# Patient Record
Sex: FEMALE | Race: WHITE
[De-identification: names, ages, dates, MRNs, and addresses within clinical notes are randomized per-mention and may not be internally consistent; named-entity substitution may affect disease eponyms.]

---

## 2017-01-16 ENCOUNTER — HOSPITAL ENCOUNTER (OUTPATIENT)
Dept: HOSPITAL 45 - C.PAPS | Age: 57
Discharge: HOME | End: 2017-01-16
Attending: OBSTETRICS & GYNECOLOGY
Payer: COMMERCIAL

## 2017-01-16 DIAGNOSIS — Z01.419: Primary | ICD-10-CM

## 2017-01-27 ENCOUNTER — HOSPITAL ENCOUNTER (OUTPATIENT)
Dept: HOSPITAL 45 - C.MAMM | Age: 57
Discharge: HOME | End: 2017-01-27
Attending: OBSTETRICS & GYNECOLOGY
Payer: COMMERCIAL

## 2017-01-27 DIAGNOSIS — Z12.31: Primary | ICD-10-CM

## 2017-01-27 NOTE — MAMMOGRAPHY REPORT
BILATERAL DIGITAL SCREENING MAMMOGRAM TOMOSYNTHESIS WITH CAD: 1/27/2017



TECHNIQUE:  Breast tomosynthesis in addition to standard 2D mammography was performed. Current study
 was also evaluated with a Computer Aided Detection (CAD) system.  



COMPARISON: Comparison is made to exams dated:  2/19/2015 mammogram, 7/18/2012 mammogram, 12/19/2013
 mammogram, 7/26/2012 ultrasound, and 7/26/2012 mammogram - [facility].   



BREAST COMPOSITION:  The tissue of both breasts is heterogeneously dense, which may obscure small ma
sses.  



FINDINGS:  No suspicious masses, calcifications, or areas of architectural distortion are noted in e
ither breast. There has been no significant interval change compared to prior exams. Scattered bilat
eral benign-appearing calcifications are not significantly changed.  

 



IMPRESSION:  ACR BI-RADS CATEGORY 2: BENIGN

There is no mammographic evidence of malignancy. A 1 year screening mammogram is recommended.  The p
atient will receive written notification of the results.  





Approximately 10% of breast cancers are not detected with mammography. A negative mammographic repor
t should not delay biopsy if a clinically suggestive mass is present.



Haley Irwin M.D.          

ah/:1/27/2017 14:28:06  



Imaging Technologist: Chrystal DENT(TATYANA)(ALFRED), [facility]

letter sent: Normal 1/2  

BI-RADS Code: ACR BI-RADS Category 2: Benign

## 2017-04-13 ENCOUNTER — HOSPITAL ENCOUNTER (OUTPATIENT)
Dept: HOSPITAL 45 - C.LAB | Age: 57
Discharge: HOME | End: 2017-04-13
Attending: INTERNAL MEDICINE
Payer: COMMERCIAL

## 2017-04-13 DIAGNOSIS — E55.9: ICD-10-CM

## 2017-04-13 DIAGNOSIS — E78.5: ICD-10-CM

## 2017-04-13 DIAGNOSIS — J45.909: ICD-10-CM

## 2017-04-13 DIAGNOSIS — R39.9: Primary | ICD-10-CM

## 2017-04-13 LAB
APPEARANCE UR: CLEAR
BILIRUB UR-MCNC: (no result) MG/DL
COLOR UR: (no result)
MANUAL MICROSCOPIC REQUIRED?: NO
NITRITE UR QL STRIP: (no result)
PH UR STRIP: 6 [PH] (ref 4.5–7.5)
REVIEW REQ?: YES
SP GR UR STRIP: 1.02 (ref 1–1.03)
URINE EPITHELIAL CELL AUTO: (no result) /LPF (ref 0–5)
UROBILINOGEN UR-MCNC: (no result) MG/DL

## 2017-04-17 ENCOUNTER — HOSPITAL ENCOUNTER (OUTPATIENT)
Dept: HOSPITAL 45 - C.LAB1850 | Age: 57
Discharge: HOME | End: 2017-04-17
Attending: INTERNAL MEDICINE
Payer: COMMERCIAL

## 2017-04-17 ENCOUNTER — HOSPITAL ENCOUNTER (OUTPATIENT)
Dept: HOSPITAL 45 - C.MRI | Age: 57
Discharge: HOME | End: 2017-04-17
Attending: INTERNAL MEDICINE
Payer: COMMERCIAL

## 2017-04-17 DIAGNOSIS — D35.00: ICD-10-CM

## 2017-04-17 DIAGNOSIS — J45.909: ICD-10-CM

## 2017-04-17 DIAGNOSIS — K86.9: Primary | ICD-10-CM

## 2017-04-17 DIAGNOSIS — E55.9: Primary | ICD-10-CM

## 2017-04-17 DIAGNOSIS — E87.6: ICD-10-CM

## 2017-04-17 DIAGNOSIS — E78.5: ICD-10-CM

## 2017-04-17 LAB
ALT SERPL-CCNC: 22 U/L (ref 12–78)
ANION GAP SERPL CALC-SCNC: 6 MMOL/L (ref 3–11)
AST SERPL-CCNC: 12 U/L (ref 15–37)
BASOPHILS # BLD: 0.03 K/UL (ref 0–0.2)
BASOPHILS NFR BLD: 0.5 %
BUN SERPL-MCNC: 18 MG/DL (ref 7–18)
BUN/CREAT SERPL: 22.5 (ref 10–20)
CALCIUM SERPL-MCNC: 8.9 MG/DL (ref 8.5–10.1)
CHLORIDE SERPL-SCNC: 106 MMOL/L (ref 98–107)
CHOLEST/HDLC SERPL: 3 {RATIO}
CO2 SERPL-SCNC: 31 MMOL/L (ref 21–32)
COMPLETE: YES
CREAT SERPL-MCNC: 0.78 MG/DL (ref 0.6–1.2)
EOSINOPHIL NFR BLD AUTO: 214 K/UL (ref 130–400)
GLUCOSE SERPL-MCNC: 89 MG/DL (ref 70–99)
GLUCOSE UR QL: 66 MG/DL
HCT VFR BLD CALC: 42 % (ref 37–47)
IG%: 0.2 %
IMM GRANULOCYTES NFR BLD AUTO: 30.7 %
KETONES UR QL STRIP: 109 MG/DL
LYMPHOCYTES # BLD: 1.89 K/UL (ref 1.2–3.4)
MCH RBC QN AUTO: 29.2 PG (ref 25–34)
MCHC RBC AUTO-ENTMCNC: 33.3 G/DL (ref 32–36)
MCV RBC AUTO: 87.7 FL (ref 80–100)
MONOCYTES NFR BLD: 5.2 %
NEUTROPHILS # BLD AUTO: 1.6 %
NEUTROPHILS NFR BLD AUTO: 61.8 %
NITRITE UR QL STRIP: 105 MG/DL (ref 0–150)
PH UR: 196 MG/DL (ref 0–200)
PMV BLD AUTO: 12 FL (ref 7.4–10.4)
POTASSIUM SERPL-SCNC: 3.4 MMOL/L (ref 3.5–5.1)
RBC # BLD AUTO: 4.79 M/UL (ref 4.2–5.4)
SODIUM SERPL-SCNC: 143 MMOL/L (ref 136–145)
TSH SERPL-ACNC: 1.63 UIU/ML (ref 0.3–4.5)
VERY LOW DENSITY LIPOPROT CALC: 21 MG/DL
WBC # BLD AUTO: 6.16 K/UL (ref 4.8–10.8)

## 2017-04-17 NOTE — DIAGNOSTIC IMAGING REPORT
ABDOMINAL MRI WITH AND WITHOUT INTRAVENOUS CONTRAST



HISTORY:      Pancreatic abnormality. Follow-up. Kidney stones.



TECHNIQUE: Multiplanar multisequence MRI of the abdomen was performed both

before and after the intravenous administration of contrast.



COMPARISON STUDY:  Abdomen and pelvis CT 6/17/2016.



FINDINGS: No change in 1.8 cm benign left adrenal adenoma. No hepatic or splenic

masses. Normal gallbladder. The pancreas is within normal limits. No pancreatic

masses. The common bile duct and main pancreatic duct are normal in course and

caliber. No retroperitoneal lymphadenopathy. The visualized loops of bowel show

no wall thickening or obstruction. There is a 4 mm cyst within the upper pole of

the left kidney. Normal right kidney.



IMPRESSION:  



1. No significant abnormality within the pancreas.

2. Stable 1.8 cm benign left adrenal adenoma. 







Electronically signed by:  Femi Dooley M.D.

4/17/2017 8:50 AM



Dictated Date/Time:  4/17/2017 8:42 AM

## 2017-05-26 ENCOUNTER — HOSPITAL ENCOUNTER (OUTPATIENT)
Dept: HOSPITAL 45 - C.LABSPEC | Age: 57
Discharge: HOME | End: 2017-05-26
Attending: PHYSICIAN ASSISTANT
Payer: COMMERCIAL

## 2017-05-26 ENCOUNTER — HOSPITAL ENCOUNTER (OUTPATIENT)
Dept: HOSPITAL 45 - C.LAB | Age: 57
Discharge: HOME | End: 2017-05-26
Attending: INTERNAL MEDICINE
Payer: COMMERCIAL

## 2017-05-26 DIAGNOSIS — R39.9: Primary | ICD-10-CM

## 2017-05-26 DIAGNOSIS — L29.8: Primary | ICD-10-CM

## 2017-05-26 LAB
APPEARANCE UR: CLEAR
BILIRUB UR-MCNC: (no result) MG/DL
COLOR UR: YELLOW
MANUAL MICROSCOPIC REQUIRED?: NO
NITRITE UR QL STRIP: (no result)
PH UR STRIP: 6.5 [PH] (ref 4.5–7.5)
REVIEW REQ?: YES
SP GR UR STRIP: 1.01 (ref 1–1.03)
URINE BILL WITH OR WITHOUT MIC: (no result)
URINE EPITHELIAL CELL AUTO: >30 /LPF (ref 0–5)
UROBILINOGEN UR-MCNC: (no result) MG/DL

## 2017-06-11 ENCOUNTER — HOSPITAL ENCOUNTER (OUTPATIENT)
Dept: HOSPITAL 45 - C.LAB | Age: 57
Discharge: HOME | End: 2017-06-11
Attending: INTERNAL MEDICINE
Payer: COMMERCIAL

## 2017-06-11 DIAGNOSIS — R39.9: Primary | ICD-10-CM

## 2017-06-11 LAB
APPEARANCE UR: CLEAR
BILIRUB UR-MCNC: (no result) MG/DL
COLOR UR: (no result)
MANUAL MICROSCOPIC REQUIRED?: NO
NITRITE UR QL STRIP: (no result)
PH UR STRIP: 6.5 [PH] (ref 4.5–7.5)
REVIEW REQ?: NO
SP GR UR STRIP: 1.01 (ref 1–1.03)
URINE EPITHELIAL CELL AUTO: (no result) /LPF (ref 0–5)
UROBILINOGEN UR-MCNC: (no result) MG/DL

## 2017-07-25 ENCOUNTER — HOSPITAL ENCOUNTER (OUTPATIENT)
Dept: HOSPITAL 45 - C.RAD | Age: 57
Discharge: HOME | End: 2017-07-25
Attending: NURSE PRACTITIONER
Payer: COMMERCIAL

## 2017-07-25 DIAGNOSIS — K59.00: ICD-10-CM

## 2017-07-25 DIAGNOSIS — N20.0: Primary | ICD-10-CM

## 2017-07-25 NOTE — DIAGNOSTIC IMAGING REPORT
KUB



CLINICAL HISTORY: Nephrolithiasis.



FINDINGS: 2 AP supine abdominal radiographs are compared to study dated

12/19/2016 and correlated with abdominal CT dated 6/17/2016. There is a

nonobstructed abdominal bowel gas pattern noting moderate colonic fecal

retention. Pill fragments project over the bowel. An 8 mm calculus is again seen

projecting over the left kidney. No calcifications are seen projecting over the

right kidney or along the course of the ureters. Surgical clips are noted in the

left hemipelvis. The bony structures appear intact. The lung bases are clear as

imaged.



IMPRESSION:



1. A nonobstructing left calculus is similar appearance to previous.



2. No additional renal calculi are identified.



3. Moderate constipation.







Electronically signed by:  Tom Chamberlain M.D.

7/25/2017 6:26 PM



Dictated Date/Time:  7/25/2017 6:24 PM

## 2017-08-15 ENCOUNTER — HOSPITAL ENCOUNTER (OUTPATIENT)
Dept: HOSPITAL 45 - C.LABSPEC | Age: 57
Discharge: HOME | End: 2017-08-15
Attending: UROLOGY
Payer: COMMERCIAL

## 2017-08-15 DIAGNOSIS — N39.0: Primary | ICD-10-CM

## 2017-08-15 DIAGNOSIS — R39.9: ICD-10-CM

## 2017-08-15 DIAGNOSIS — N39.46: ICD-10-CM

## 2017-09-06 ENCOUNTER — HOSPITAL ENCOUNTER (OUTPATIENT)
Dept: HOSPITAL 45 - C.LAB | Age: 57
Discharge: HOME | End: 2017-09-06
Attending: UROLOGY
Payer: COMMERCIAL

## 2017-09-06 DIAGNOSIS — R39.15: ICD-10-CM

## 2017-09-06 DIAGNOSIS — R30.0: Primary | ICD-10-CM

## 2017-12-13 ENCOUNTER — HOSPITAL ENCOUNTER (OUTPATIENT)
Dept: HOSPITAL 45 - C.LAB | Age: 57
Discharge: HOME | End: 2017-12-13
Attending: INTERNAL MEDICINE
Payer: COMMERCIAL

## 2017-12-13 DIAGNOSIS — E87.6: ICD-10-CM

## 2017-12-13 DIAGNOSIS — E55.9: ICD-10-CM

## 2017-12-13 DIAGNOSIS — E78.5: Primary | ICD-10-CM

## 2017-12-13 DIAGNOSIS — R73.9: ICD-10-CM

## 2017-12-13 DIAGNOSIS — J45.909: ICD-10-CM

## 2017-12-13 LAB
ALT SERPL-CCNC: 25 U/L (ref 12–78)
ANION GAP SERPL CALC-SCNC: 6 MMOL/L (ref 3–11)
AST SERPL-CCNC: 10 U/L (ref 15–37)
BUN SERPL-MCNC: 19 MG/DL (ref 7–18)
BUN/CREAT SERPL: 23.9 (ref 10–20)
CALCIUM SERPL-MCNC: 9.3 MG/DL (ref 8.5–10.1)
CHLORIDE SERPL-SCNC: 100 MMOL/L (ref 98–107)
CHOLEST/HDLC SERPL: 2.6 {RATIO}
CO2 SERPL-SCNC: 30 MMOL/L (ref 21–32)
CREAT SERPL-MCNC: 0.79 MG/DL (ref 0.6–1.2)
EOSINOPHIL NFR BLD AUTO: 212 K/UL (ref 130–400)
EST. AVERAGE GLUCOSE BLD GHB EST-MCNC: 114 MG/DL
GLUCOSE SERPL-MCNC: 92 MG/DL (ref 70–99)
GLUCOSE UR QL: 84 MG/DL
HCT VFR BLD CALC: 44 % (ref 37–47)
KETONES UR QL STRIP: 114 MG/DL
MAGNESIUM SERPL-MCNC: 2.3 MG/DL (ref 1.8–2.4)
MCH RBC QN AUTO: 29.9 PG (ref 25–34)
MCHC RBC AUTO-ENTMCNC: 33.9 G/DL (ref 32–36)
MCV RBC AUTO: 88.2 FL (ref 80–100)
NITRITE UR QL STRIP: 103 MG/DL (ref 0–150)
PH UR: 219 MG/DL (ref 0–200)
PMV BLD AUTO: 11.6 FL (ref 7.4–10.4)
POTASSIUM SERPL-SCNC: 3.4 MMOL/L (ref 3.5–5.1)
RBC # BLD AUTO: 4.99 M/UL (ref 4.2–5.4)
SODIUM SERPL-SCNC: 137 MMOL/L (ref 136–145)
TSH SERPL-ACNC: 1.74 UIU/ML (ref 0.3–4.5)
VERY LOW DENSITY LIPOPROT CALC: 21 MG/DL
WBC # BLD AUTO: 6.61 K/UL (ref 4.8–10.8)

## 2017-12-22 ENCOUNTER — HOSPITAL ENCOUNTER (OUTPATIENT)
Dept: HOSPITAL 45 - C.LABSPEC | Age: 57
Discharge: HOME | End: 2017-12-22
Attending: PHYSICIAN ASSISTANT
Payer: COMMERCIAL

## 2017-12-22 DIAGNOSIS — R39.9: Primary | ICD-10-CM

## 2017-12-22 DIAGNOSIS — R39.15: ICD-10-CM

## 2018-01-26 ENCOUNTER — HOSPITAL ENCOUNTER (OUTPATIENT)
Dept: HOSPITAL 45 - C.LAB | Age: 58
Discharge: HOME | End: 2018-01-26
Attending: PHYSICIAN ASSISTANT
Payer: COMMERCIAL

## 2018-01-26 DIAGNOSIS — R31.29: Primary | ICD-10-CM

## 2018-01-26 DIAGNOSIS — R39.9: ICD-10-CM

## 2018-04-23 ENCOUNTER — HOSPITAL ENCOUNTER (EMERGENCY)
Dept: HOSPITAL 45 - C.EDB | Age: 58
Discharge: HOME | End: 2018-04-23
Payer: COMMERCIAL

## 2018-04-23 VITALS — HEART RATE: 81 BPM | DIASTOLIC BLOOD PRESSURE: 86 MMHG | OXYGEN SATURATION: 93 % | SYSTOLIC BLOOD PRESSURE: 118 MMHG

## 2018-04-23 VITALS
HEIGHT: 62.99 IN | WEIGHT: 158.51 LBS | BODY MASS INDEX: 28.09 KG/M2 | WEIGHT: 158.51 LBS | BODY MASS INDEX: 28.09 KG/M2 | HEIGHT: 62.99 IN

## 2018-04-23 VITALS — TEMPERATURE: 97.88 F
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
## 2018-04-23 DIAGNOSIS — S09.90XA: Primary | ICD-10-CM
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
## 2018-04-23 NOTE — EMERGENCY ROOM VISIT NOTE
History


First contact with patient:  21:07


Chief Complaint:  FALL


Stated Complaint:  LIGHT HEADED- WORRIED ABOUT CONCUSION





History of Present Illness


The patient is a 58 year old female who presents to the Emergency Room with 

complaints of a closed head injury which occurred yesterday.  Patient reports 

that yesterday evening, she fell while getting out of a moeller at a restaurant 

and struck her head on the moeller.  She states that she initially was feeling 

fine, but she has developed some lightheadedness, slight nausea and a dull, 

aching pain in her head.  She rates the discomfort a 2/10.  She has not taken 

any medication for her pain.  There was no loss of consciousness.  She denies 

vision changes, slurred speech, confusion, numbness or weakness.  She does 

state that she is concerned because she has had a previous retinal tear.





Review of Systems


A complete 10 point review of systems was reviewed with the patient with 

pertinent positives and negatives as per history of present illness. All else 

were negative.





Past Medical/Surgical History





Medical Problems:


(1) Retinal tear





Social History


Smoking Status:  Never Smoker


Alcohol Use:  occasionally


Housing Status:  lives with family


Occupation Status:  employed





Current/Historical Medications


Scheduled


Bupropion (Wellbutrin Sr), 150 MG PO TID


Chlorthalidone (Chlorthalidone), PO HS


Cholecalciferol (Vitamin D 1000 Unit), 2,000 INTER.UNIT PO DAILY


Cyclosporine (Restasis Eye Drops), 1 DROP OP BID


Potassium Ext Rel (Klor-Con), 10 MEQ PO TID


Ranitidine Hcl (Zantac), 75 MG PO DAILY


[Lanzoprasole], 60 PO DAILY





Miscellaneous Medications


Cranberry-Vitamin C-Vitamin E (Cranberry), 2 PO





Physical Exam


Vital Signs











  Date Time  Temp Pulse Resp B/P (MAP) Pulse Ox O2 Delivery O2 Flow Rate FiO2


 


4/23/18 22:16  81 16 118/86 93   


 


4/23/18 20:59 36.6 86 16 136/72 96 Room Air  











Physical Exam


VITALS: Vitals are noted on the nurse's note and reviewed by myself.  Vital 

signs stable.


GENERAL: This is a 58-year-old female, in no acute distress, nondiaphoretic, 

well-developed well-nourished.


SKIN: The skin was without rashes, erythema, edema, or bruising.  


HEAD: Normocephalic atraumatic.  


EARS: External auditory canals clear, tympanic membranes pearly gray without 

erythema or effusion bilaterally.


EYES: Pupils equal round and reactive to light and accommodation.  Extraocular 

movements intact.  


MOUTH: Mucous membranes moist.  Tonsils are not enlarged.  Pharynx without 

erythema or exudate.  


NECK: Supple without nuchal rigidity.  Cervical spine is nontender. 


HEART: Regular rate and rhythm without murmurs gallops or rubs.


LUNGS: Clear to auscultation bilaterally without wheezes, rales or rhonchi.  


MUSCULOSKELETAL:   Strength 5/5 throughout.


NEURO: Patient was alert and oriented to person place and time. No focal 

neurological deficits.





Medical Decision & Procedures


Medical Decision


The patient was evaluated as above.  She presents here for evaluation after a 

head injury which occurred yesterday.  The patient is well-appearing and in no 

acute distress.  There are no neurological deficits on exam.  The patient has 

no concerning symptoms.  Discussed the benefits/risks of imaging, and the 

patient agreed that imaging was not necessary at this time.  She will follow-up 

with her PCP as needed.  She verbalized understanding of my assessment and 

treatment plan and was discharged home in good condition.





Head Trauma


GCS Score:  15





Medication Reconcilliation


Current Medication List:  was personally reviewed by me





Blood Pressure Screening


Patient's blood pressure:  Normal blood pressure





Impression





 Primary Impression:  


 Closed head injury





Departure Information


Dispostion


Home / Self-Care





Condition


GOOD





Referrals


Pro,Chava STRICKLAND M.D. (PCP)





Patient Instructions


My [facility]





Additional Instructions





You have been treated in the Emergency Department for a Closed Head Injury. 





For pain control, you can use the following over-the-counter medicines (if >11 yo):





- Regular strength (325mg/tab) Tylenol (acetaminophen) 2 tabs every 4-6 hours 

as needed. Do not exceed 12 tablets in a 24 hour period. Avoid taking more than 

4 grams (4000 mg) of Tylenol per day. This includes any other sources of 

acetaminophen you may take on a regular basis.





- Regular strength (200 mg/tab) Advil (ibuprofen) 1-2 tabs every 4-6 hours as 

needed. Do not exceed a dose of 3200 mg per day.





You should relax in a quiet, dark place for the rest of the day. Avoid any 

possible triggers including: cigarette smoke, caffeine, nicotine, chocolate, 

wine, beer, loud noises or music, or bright lights. 





Follow-up with your primary care provider this week for any persistent symptoms.





Follow-up with your ophthalmologist for any vision changes or other concerns.





Return to the Emergency Department if your current symptoms worsen despite 

treatment course outlined above, or if you develop any of the following symptoms

: intractable pain despite aforementioned treatment course, visual disturbances

, loss of vision, unilateral weakness or facial drooping, slurring of speech, 

loss of coordination, or loss of consciousness.





Problem Qualifiers








 Primary Impression:  


 Closed head injury


 Encounter type:  initial encounter  Qualified Codes:  S09.90XA - Unspecified 

injury of head, initial encounter

## 2018-05-01 ENCOUNTER — HOSPITAL ENCOUNTER (OUTPATIENT)
Dept: HOSPITAL 45 - C.RAD1850 | Age: 58
Discharge: HOME | End: 2018-05-01
Attending: INTERNAL MEDICINE
Payer: COMMERCIAL

## 2018-05-01 DIAGNOSIS — Z87.898: ICD-10-CM

## 2018-05-01 DIAGNOSIS — R05: Primary | ICD-10-CM

## 2018-05-01 NOTE — DIAGNOSTIC IMAGING REPORT
CHEST 2 VIEWS ROUTINE



HISTORY:  58 years-old Female R05 Cough productive of purulent [id number]

History of feverR acute productive cough with fever



COMPARISON: Chest radiographs 11/2/2016



TECHNIQUE: PA and lateral views of the chest



FINDINGS: 

Cardiomediastinal and hilar silhouettes are within normal limits. No

pneumothorax, pleural effusion, focal airspace consolidation or overt pulmonary

edema. The bones of the chest appear grossly intact. Multilevel degenerative

changes about the spine. Metallic density structure projects over the lower

abdomen, possibly external to the patient.



IMPRESSION: No acute process. 







The above report was generated using voice recognition software. It may contain

grammatical, syntax or spelling errors.







Electronically signed by:  Rickie Saeed M.D.

5/1/2018 4:32 PM



Dictated Date/Time:  5/1/2018 4:31 PM

## 2018-05-03 ENCOUNTER — HOSPITAL ENCOUNTER (OUTPATIENT)
Dept: HOSPITAL 45 - C.LAB | Age: 58
Discharge: HOME | End: 2018-05-03
Attending: INTERNAL MEDICINE
Payer: COMMERCIAL

## 2018-05-03 DIAGNOSIS — E55.9: ICD-10-CM

## 2018-05-03 DIAGNOSIS — R73.9: ICD-10-CM

## 2018-05-03 DIAGNOSIS — E78.5: Primary | ICD-10-CM

## 2018-05-03 LAB
ALT SERPL-CCNC: 28 U/L (ref 12–78)
AST SERPL-CCNC: 14 U/L (ref 15–37)
BUN SERPL-MCNC: 15 MG/DL (ref 7–18)
CALCIUM SERPL-MCNC: 9.4 MG/DL (ref 8.5–10.1)
CO2 SERPL-SCNC: 28 MMOL/L (ref 21–32)
CREAT SERPL-MCNC: 0.78 MG/DL (ref 0.6–1.2)
GLUCOSE SERPL-MCNC: 84 MG/DL (ref 70–99)
HBA1C MFR BLD: 5.6 % (ref 4.5–5.6)
KETONES UR QL STRIP: 117 MG/DL
PH UR: 196 MG/DL (ref 0–200)
POTASSIUM SERPL-SCNC: 3.5 MMOL/L (ref 3.5–5.1)
SODIUM SERPL-SCNC: 137 MMOL/L (ref 136–145)

## 2018-05-09 ENCOUNTER — HOSPITAL ENCOUNTER (OUTPATIENT)
Dept: HOSPITAL 45 - C.LABSPEC | Age: 58
Discharge: HOME | End: 2018-05-09
Attending: OBSTETRICS & GYNECOLOGY
Payer: COMMERCIAL

## 2018-05-09 DIAGNOSIS — B37.3: Primary | ICD-10-CM

## 2018-05-23 ENCOUNTER — HOSPITAL ENCOUNTER (OUTPATIENT)
Dept: HOSPITAL 45 - C.CTS | Age: 58
Discharge: HOME | End: 2018-05-23
Attending: INTERNAL MEDICINE
Payer: COMMERCIAL

## 2018-05-23 DIAGNOSIS — J32.9: Primary | ICD-10-CM

## 2018-05-23 NOTE — DIAGNOSTIC IMAGING REPORT
CT SCAN OF THE PARANASAL SINUSES



CLINICAL HISTORY: Sinus infections.



COMPARISON STUDY:  No priors.



TECHNIQUE:  High-resolution CT scan of the paranasal sinuses is performed.

Images are reviewed in the axial, sagittal, and coronal planes. IV contrast was

not administered for this examination.  A dose lowering technique was utilized

adhering to the principles of ALARA.



CT DOSE: 253.14 mGycm



FINDINGS:



Maxillary antra: Clear bilaterally.



Anterior ethmoid sinuses: Clear.



Posterior ethmoid sinuses: Clear.



Sphenoid sinuses: Trace mucosal thickening is seen on the left. Clear on the

right.



Frontal sinuses: Clear.



Ostiomeatal complexes: Patent bilaterally.



Frontoethmoidal and sphenoethmoidal recesses: Patent bilaterally.



Carotid arteries: The carotid arteries are covered and without septal

attachments.



Ethmoid roofs: There is asymmetric elevation of the left ethmoid roof as

compared to the right.



Nasal turbinates: Normal in appearance.



Nasal septum: There is leftward deviation of the bony nasal septum with a small

spur.



Optic nerves: Covered.



Orbits: The bony orbits are intact. Orbital contents are normal in appearance.



Calvarium: The imaged calvarium is normal in appearance



Mastoid air cells: Well pneumatized.



Brain parenchyma: Partially visualized brain parenchyma is within normal limits.





IMPRESSION: No significant paranasal sinus disease. See above.







Electronically signed by:  Tom Chamberlain M.D.

5/23/2018 4:15 PM



Dictated Date/Time:  5/23/2018 4:13 PM

## 2018-07-23 ENCOUNTER — HOSPITAL ENCOUNTER (OUTPATIENT)
Dept: HOSPITAL 45 - C.LAB1850 | Age: 58
Discharge: HOME | End: 2018-07-23
Attending: INTERNAL MEDICINE
Payer: COMMERCIAL

## 2018-07-23 DIAGNOSIS — R39.15: ICD-10-CM

## 2018-07-23 DIAGNOSIS — M79.643: ICD-10-CM

## 2018-07-23 DIAGNOSIS — M25.561: ICD-10-CM

## 2018-07-23 DIAGNOSIS — J32.9: Primary | ICD-10-CM

## 2018-07-23 DIAGNOSIS — K21.9: ICD-10-CM

## 2018-07-23 DIAGNOSIS — M85.80: ICD-10-CM

## 2018-07-23 LAB
ALBUMIN SERPL-MCNC: 3.7 GM/DL (ref 3.4–5)
ALP SERPL-CCNC: 82 U/L (ref 45–117)
ALT SERPL-CCNC: 27 U/L (ref 12–78)
AST SERPL-CCNC: 15 U/L (ref 15–37)
BASOPHILS # BLD: 0.03 K/UL (ref 0–0.2)
BASOPHILS NFR BLD: 0.5 %
CALCIUM SERPL-MCNC: 8.7 MG/DL (ref 8.5–10.1)
CREAT SERPL-MCNC: 0.8 MG/DL (ref 0.6–1.2)
EOS ABS #: 0.15 K/UL (ref 0–0.5)
EOSINOPHIL NFR BLD AUTO: 207 K/UL (ref 130–400)
HCT VFR BLD CALC: 40.6 % (ref 37–47)
HGB BLD-MCNC: 13.7 G/DL (ref 12–16)
IG#: 0.01 K/UL (ref 0–0.02)
IMM GRANULOCYTES NFR BLD AUTO: 32.4 %
LYMPHOCYTES # BLD: 2.06 K/UL (ref 1.2–3.4)
MCH RBC QN AUTO: 29.4 PG (ref 25–34)
MCHC RBC AUTO-ENTMCNC: 33.7 G/DL (ref 32–36)
MCV RBC AUTO: 87.1 FL (ref 80–100)
MONO ABS #: 0.38 K/UL (ref 0.11–0.59)
MONOCYTES NFR BLD: 6 %
NEUT ABS #: 3.73 K/UL (ref 1.4–6.5)
NEUTROPHILS # BLD AUTO: 2.4 %
NEUTROPHILS NFR BLD AUTO: 58.5 %
PMV BLD AUTO: 11.6 FL (ref 7.4–10.4)
PROT SERPL-MCNC: 7.2 GM/DL (ref 6.4–8.2)
RED CELL DISTRIBUTION WIDTH CV: 13.3 % (ref 11.5–14.5)
RED CELL DISTRIBUTION WIDTH SD: 42.6 FL (ref 36.4–46.3)
WBC # BLD AUTO: 6.36 K/UL (ref 4.8–10.8)